# Patient Record
Sex: FEMALE | Race: BLACK OR AFRICAN AMERICAN | NOT HISPANIC OR LATINO | Employment: STUDENT | URBAN - METROPOLITAN AREA
[De-identification: names, ages, dates, MRNs, and addresses within clinical notes are randomized per-mention and may not be internally consistent; named-entity substitution may affect disease eponyms.]

---

## 2022-07-12 ENCOUNTER — OFFICE VISIT (OUTPATIENT)
Dept: URGENT CARE | Facility: URGENT CARE | Age: 22
End: 2022-07-12
Payer: COMMERCIAL

## 2022-07-12 VITALS
SYSTOLIC BLOOD PRESSURE: 106 MMHG | HEART RATE: 79 BPM | TEMPERATURE: 98.5 F | DIASTOLIC BLOOD PRESSURE: 63 MMHG | WEIGHT: 119.8 LBS | OXYGEN SATURATION: 96 %

## 2022-07-12 DIAGNOSIS — Z86.69 HISTORY OF RECURRENT EAR INFECTION: ICD-10-CM

## 2022-07-12 DIAGNOSIS — H92.02 LEFT EAR PAIN: Primary | ICD-10-CM

## 2022-07-12 PROCEDURE — 99203 OFFICE O/P NEW LOW 30 MIN: CPT | Performed by: PHYSICIAN ASSISTANT

## 2022-07-12 NOTE — PROGRESS NOTES
Chief Complaint   Patient presents with     Otitis Media     Pt keeps getting ear infection in left ear for past year keeps coming and going, Pt has used antibiotics and ear drops nothing seems to help, Pt would like a referral to ENT                     ASSESSMENT:     ICD-10-CM    1. Left ear pain  H92.02 Adult ENT  Referral   2. History of recurrent ear infection  Z86.69 Adult ENT  Referral           PLAN: Left ear looks fine today.  Continue antibiotic eardrops.  ENT referral.  I have discussed clinical findings with patient.  Symptomatic care is discussed.  I have discussed the possibility of  worsening symptoms and indication to RTC or go to the ER if they occur.  All questions are answered, patient indicates understanding of these issues and is in agreement with plan.   Patient care instructions are discussed/given at the end of visit.   Lots of rest and fluids.      Fadia Steele PA-C      SUBJECTIVE:  21-year-old female presents for ENT referral.  States she has had recurrent left ear infections over the past 6 months or so.  Chart reviewed.  She has had 4 ear infections since September 2021.  Treatments have included oral amoxicillin, oral Augmentin, Polytrim, ofloxacin, Ciprodex.  Went to the ER on 710.  Is currently on Floxin  antibiotic drops for it.  Denies any decreased hearing.      No Known Allergies    No past medical history on file.    No current outpatient medications on file prior to visit.  No current facility-administered medications on file prior to visit.      Social History     Tobacco Use     Smoking status: Not on file     Smokeless tobacco: Not on file   Substance Use Topics     Alcohol use: Not on file       ROS:  CONSTITUTIONAL: Negative for fatigue or fever.  EYES: Negative for eye problems.  ENT: As above.  RESP: As above.  CV: Negative for chest pains.  GI: Negative for vomiting.  MUSCULOSKELETAL:  Negative for significant muscle or joint  pains.  NEUROLOGIC: Negative for headaches.  SKIN: Negative for rash.  PSYCH: Normal mentation for age.    OBJECTIVE:  /63 (BP Location: Left arm, Patient Position: Sitting, Cuff Size: Adult Small)   Pulse 79   Temp 98.5  F (36.9  C) (Axillary)   Wt 54.3 kg (119 lb 12.8 oz)   SpO2 96%   GENERAL APPEARANCE: Healthy, alert and no distress.  EYES:Conjunctiva/sclera clear.  No TMJ tenderness.  No palpable clicking/popping with opening/closing mouth.  EARS: No cerumen.   Ear canals w/o erythema.  TM's intact w/o erythema.  No tragal tenderness.  SINUSES: No maxillary sinus tenderness.  THROAT: No erythema w/o tonsillar enlargement . No exudates.  NECK: Supple, nontender, no lymphadenopathy.  RESP: Lungs clear to auscultation - no rales, rhonchi or wheezes  CV: Regular rate and rhythm, normal S1 S2, no murmur noted.  NEURO: Awake, alert    SKIN: No rashes        Fadia Steele PA-C